# Patient Record
Sex: MALE | Race: OTHER | URBAN - METROPOLITAN AREA
[De-identification: names, ages, dates, MRNs, and addresses within clinical notes are randomized per-mention and may not be internally consistent; named-entity substitution may affect disease eponyms.]

---

## 2024-05-30 ENCOUNTER — HOSPITAL ENCOUNTER (EMERGENCY)
Facility: HOSPITAL | Age: 54
Discharge: HOME | End: 2024-05-31
Attending: EMERGENCY MEDICINE

## 2024-05-30 VITALS
RESPIRATION RATE: 18 BRPM | HEART RATE: 89 BPM | WEIGHT: 174.6 LBS | TEMPERATURE: 98.2 F | DIASTOLIC BLOOD PRESSURE: 86 MMHG | SYSTOLIC BLOOD PRESSURE: 142 MMHG | OXYGEN SATURATION: 100 %

## 2024-05-30 DIAGNOSIS — E78.2 ERUPTIVE XANTHOMA: Primary | ICD-10-CM

## 2024-05-30 PROCEDURE — 99282 EMERGENCY DEPT VISIT SF MDM: CPT

## 2024-05-31 LAB
ALBUMIN SERPL BCP-MCNC: 4.2 G/DL (ref 3.5–5)
ALP SERPL-CCNC: 67 U/L (ref 34–104)
ALT SERPL W P-5'-P-CCNC: 26 U/L (ref 7–52)
ANION GAP SERPL CALCULATED.3IONS-SCNC: 12 MMOL/L (ref 4–13)
AST SERPL W P-5'-P-CCNC: 22 U/L (ref 13–39)
BILIRUB SERPL-MCNC: 0.29 MG/DL (ref 0.2–1)
BUN SERPL-MCNC: 13 MG/DL (ref 5–25)
CALCIUM SERPL-MCNC: 9.3 MG/DL (ref 8.4–10.2)
CHLORIDE SERPL-SCNC: 100 MMOL/L (ref 96–108)
CHOLEST SERPL-MCNC: 150 MG/DL
CO2 SERPL-SCNC: 20 MMOL/L (ref 21–32)
CREAT SERPL-MCNC: 0.9 MG/DL (ref 0.6–1.3)
GFR SERPL CREATININE-BSD FRML MDRD: 98 ML/MIN/1.73SQ M
GLUCOSE SERPL-MCNC: 354 MG/DL (ref 65–140)
HDLC SERPL-MCNC: 21 MG/DL
LDLC SERPL DIRECT ASSAY-MCNC: 29 MG/DL (ref 0–100)
POTASSIUM SERPL-SCNC: 4.2 MMOL/L (ref 3.5–5.3)
PROT SERPL-MCNC: 7.1 G/DL (ref 6.4–8.4)
SODIUM SERPL-SCNC: 132 MMOL/L (ref 135–147)
TRIGL SERPL-MCNC: 733 MG/DL

## 2024-05-31 PROCEDURE — 99284 EMERGENCY DEPT VISIT MOD MDM: CPT

## 2024-05-31 PROCEDURE — 80061 LIPID PANEL: CPT

## 2024-05-31 PROCEDURE — 80053 COMPREHEN METABOLIC PANEL: CPT

## 2024-05-31 PROCEDURE — 83721 ASSAY OF BLOOD LIPOPROTEIN: CPT

## 2024-05-31 PROCEDURE — 36415 COLL VENOUS BLD VENIPUNCTURE: CPT

## 2024-05-31 NOTE — ED PROVIDER NOTES
History  Chief Complaint   Patient presents with    Rash     Pt reports painful rash/bumps to knees and elbows for 1 week.      53-year-old male presents to ED for evaluation of painful rash of elbows and knees.  Patient states that the past week he has noticed formation of painful bumps on his elbows and knees.  No known sick contacts with similar rash.  Rash is only on elbows and knees.  It is not on his back, abdomen, feet, hands, face, chest.  No other symptoms such as fever, chills, nausea, vomiting, chest pain, shortness of breath, dysuria, penile drainage, hematuria, testicular pain.  Picture of rash seen in physical exam portion of note.  No new environmental exposures such as laundry detergent, clothing, foods, animals.  The rash is nonpruritic.  It is mainly painful.  He attempted to break open one of the lesions however could not due to it being hard textured. No other concerns at this time.          None       History reviewed. No pertinent past medical history.    History reviewed. No pertinent surgical history.    History reviewed. No pertinent family history.  I have reviewed and agree with the history as documented.    E-Cigarette/Vaping     E-Cigarette/Vaping Substances     Social History     Tobacco Use    Smoking status: Every Day     Types: Cigarettes    Smokeless tobacco: Never       Review of Systems   Skin:  Positive for rash.   All other systems reviewed and are negative.      Physical Exam  Physical Exam  Vitals and nursing note reviewed.   Constitutional:       General: He is not in acute distress.     Appearance: Normal appearance. He is well-developed. He is not ill-appearing, toxic-appearing or diaphoretic.   HENT:      Head: Normocephalic and atraumatic.   Eyes:      General: No scleral icterus.        Right eye: No discharge.         Left eye: No discharge.      Extraocular Movements: Extraocular movements intact.      Conjunctiva/sclera: Conjunctivae normal.      Pupils: Pupils are  equal, round, and reactive to light.   Cardiovascular:      Rate and Rhythm: Normal rate and regular rhythm.      Pulses: Normal pulses.      Heart sounds: Normal heart sounds. No murmur heard.     No friction rub. No gallop.   Pulmonary:      Effort: Pulmonary effort is normal. No respiratory distress.      Breath sounds: Normal breath sounds. No stridor. No wheezing, rhonchi or rales.   Abdominal:      Palpations: Abdomen is soft.      Tenderness: There is no abdominal tenderness.   Musculoskeletal:         General: No swelling.      Cervical back: Neck supple.   Skin:     General: Skin is warm and dry.      Capillary Refill: Capillary refill takes less than 2 seconds.      Coloration: Skin is not jaundiced or pale.      Findings: Rash present. No erythema.   Neurological:      Mental Status: He is alert.   Psychiatric:         Mood and Affect: Mood normal.                           Vital Signs  ED Triage Vitals [05/30/24 2347]   Temperature Pulse Respirations Blood Pressure SpO2   98.2 °F (36.8 °C) 89 18 142/86 100 %      Temp Source Heart Rate Source Patient Position - Orthostatic VS BP Location FiO2 (%)   Oral Monitor Sitting Right arm --      Pain Score       7           Vitals:    05/30/24 2347   BP: 142/86   Pulse: 89   Patient Position - Orthostatic VS: Sitting         Visual Acuity      ED Medications  Medications - No data to display    Diagnostic Studies  Results Reviewed       None                   No orders to display              Procedures  Procedures         ED Course                               SBIRT 20yo+      Flowsheet Row Most Recent Value   Initial Alcohol Screen: US AUDIT-C     1. How often do you have a drink containing alcohol? 0 Filed at: 05/31/2024 0004   2. How many drinks containing alcohol do you have on a typical day you are drinking?  0 Filed at: 05/31/2024 0004   3a. Male UNDER 65: How often do you have five or more drinks on one occasion? 0 Filed at: 05/31/2024 0004   Audit-C  Score 0 Filed at: 05/31/2024 0004   [place]: How many times in the past year have you...    Used an illegal drug or used a prescription medication for non-medical reasons? Never Filed at: 05/31/2024 0004                      Medical Decision Making  53-year-old male presents to ED for evaluation of rash as above.  On physical examination patient vital signs stable.  Afebrile, nontachycardic, normotensive.  Alert and responding to questions appropriately.  No murmur.  Normal breath sounds.  Nontoxic-appearing.  Rash consisting of scattered pustules seen on elbow, knees.  Rash is nonvesicular, no desquamation, no purpura, no petechiae.  No rash seen on chest, abdomen, back, feet, hands.  No oral lesions seen.  Sent pictures of rash to on-call dermatologist Dr. Coulter who advised that the rash appears consistent with eruptive xanthoma. Advised to check lipid panel and follow up as outpatient.  Obtained lipid panel, CMP.  Provided patient with referral to dermatology.  Provided patient with contact information to obtain a primary care provider.  Encouraged patient to establish care with a primary care provider to have yearly labs as needed.  Patient does not currently have a primary care provider.  Possibility that he has hypertriglyceridemia which is contributing to suspected eruptive xanthomas. Advised to return to ED for new worsening symptoms. Patient in agreement with plan. Patient discharged.     Prior to discharge, discharge instructions were discussed with patient at bedside. Patient was provided both verbal and written instructions. Patient is understanding of the discharge instructions and is agreeable to plan of care. Return precautions were discussed with patient bedside, patient verbalized understanding of signs and symptoms that would necessitate return to the ED. All questions were answered. Patient was comfortable with the plan of care and discharged to home.     Amount and/or Complexity of Data  Reviewed  Labs: ordered.             Disposition  Final diagnoses:   Eruptive xanthoma     Time reflects when diagnosis was documented in both MDM as applicable and the Disposition within this note       Time User Action Codes Description Comment    5/31/2024 12:51 AM Jared Stack Add [E78.2] Eruptive xanthoma           ED Disposition       ED Disposition   Discharge    Condition   Stable    Date/Time   Fri May 31, 2024 12:49 AM    Comment   Anthony Parks discharge to home/self care.                   Follow-up Information       Follow up With Specialties Details Why Contact Info Additional Information    [facility]   [address] [id number] [phone] [facility], [address], [id number] [phone]    [facility]    [phone]               There are no discharge medications for this patient.      No discharge procedures on file.    PDMP Review       None            ED Provider  Electronically Signed by             Jared Stack PA-C  05/31/24 0618

## 2024-05-31 NOTE — DISCHARGE INSTRUCTIONS
Call [employer] at  [phone] [facility] [phone]) to obtain a primary care physician.  They will be able to schedule you with a physician who sees patients with your insurance and physicians who see patients without insurance.    Today I provided referral to dermatology.  Contact their office to make an appointment for continued monitoring of rash.  Recommend obtaining a primary care provider to begin annual monitoring.   Return to ED for new or worsening symptoms.